# Patient Record
Sex: MALE | Race: WHITE | NOT HISPANIC OR LATINO | URBAN - METROPOLITAN AREA
[De-identification: names, ages, dates, MRNs, and addresses within clinical notes are randomized per-mention and may not be internally consistent; named-entity substitution may affect disease eponyms.]

---

## 2021-03-15 ENCOUNTER — OFFICE VISIT (OUTPATIENT)
Dept: URBAN - METROPOLITAN AREA CLINIC 68 | Facility: CLINIC | Age: 81
End: 2021-03-15

## 2021-04-05 ENCOUNTER — OFFICE VISIT (OUTPATIENT)
Dept: URBAN - METROPOLITAN AREA CLINIC 68 | Facility: CLINIC | Age: 81
End: 2021-04-05

## 2021-10-01 ENCOUNTER — OFFICE VISIT (OUTPATIENT)
Dept: URBAN - METROPOLITAN AREA CLINIC 68 | Facility: CLINIC | Age: 81
End: 2021-10-01

## 2021-10-18 ENCOUNTER — OFFICE VISIT (OUTPATIENT)
Dept: URBAN - METROPOLITAN AREA CLINIC 68 | Facility: CLINIC | Age: 81
End: 2021-10-18

## 2021-11-05 ENCOUNTER — OFFICE VISIT (OUTPATIENT)
Dept: URBAN - METROPOLITAN AREA CLINIC 68 | Facility: CLINIC | Age: 81
End: 2021-11-05

## 2021-11-11 ENCOUNTER — OFFICE VISIT (OUTPATIENT)
Dept: URBAN - METROPOLITAN AREA SURGERY CENTER 12 | Facility: SURGERY CENTER | Age: 81
End: 2021-11-11

## 2021-11-12 ENCOUNTER — LAB OUTSIDE AN ENCOUNTER (OUTPATIENT)
Dept: URBAN - METROPOLITAN AREA CLINIC 68 | Facility: CLINIC | Age: 81
End: 2021-11-12

## 2021-11-12 LAB — 01: (no result)

## 2021-11-24 ENCOUNTER — OFFICE VISIT (OUTPATIENT)
Dept: URBAN - METROPOLITAN AREA CLINIC 68 | Facility: CLINIC | Age: 81
End: 2021-11-24

## 2022-06-04 ENCOUNTER — TELEPHONE ENCOUNTER (OUTPATIENT)
Dept: URBAN - METROPOLITAN AREA CLINIC 68 | Facility: CLINIC | Age: 82
End: 2022-06-04

## 2022-06-04 RX ORDER — METHYLNALTREXONE BROMIDE 150 MG/1
TABLET ORAL
Qty: 12 | Refills: 0 | OUTPATIENT
Start: 2017-08-29 | End: 2017-09-02

## 2022-06-04 RX ORDER — OXYCODONE HYDROCHLORIDE AND ACETAMINOPHEN 10; 325 MG/1; MG/1
PERCOCET( 10-325MG ORAL  AS NEEDED ) INACTIVE -HX ENTRY TABLET ORAL AS NEEDED
OUTPATIENT
Start: 2017-07-28

## 2022-06-04 RX ORDER — CLOPIDOGREL BISULFATE 75 MG
PLAVIX( 75MG ORAL  DAILY ) INACTIVE -HX ENTRY TABLET ORAL DAILY
OUTPATIENT
Start: 2018-01-31

## 2022-06-04 RX ORDER — POLYETHYLENE GLYCOL 3350, SODIUM SULFATE, SODIUM CHLORIDE, POTASSIUM CHLORIDE, ASCORBIC ACID, SODIUM ASCORBATE 7.5-2.691G
KIT ORAL
Qty: 1 | Refills: 0 | OUTPATIENT
Start: 2015-06-23 | End: 2015-06-25

## 2022-06-04 RX ORDER — B-COMPLEX WITH VITAMIN C
VITAMIN B COMPLEX(    ON TABLET BY MOUTH EACH DAY ) INACTIVE -HX ENTRY TABLET ORAL
OUTPATIENT
Start: 2008-12-10

## 2022-06-04 RX ORDER — FLUCONAZOLE 50 MG/1
FLUCONAZOLE( 50MG ORAL  DAILY ) INACTIVE -HX ENTRY TABLET ORAL DAILY
OUTPATIENT
Start: 2019-06-18

## 2022-06-04 RX ORDER — MONTELUKAST SODIUM 10 MG/1
TABLET, FILM COATED ORAL
OUTPATIENT
Start: 2008-12-10 | End: 2014-09-08

## 2022-06-04 RX ORDER — GABAPENTIN 300 MG/1
CAPSULE ORAL
OUTPATIENT
Start: 2011-09-20 | End: 2014-09-08

## 2022-06-04 RX ORDER — TRAMADOL HYDROCHLORIDE 50 MG/1
TRAMADOL HCL( 50MG ORAL  AS DIRECTED ) INACTIVE -HX ENTRY TABLET ORAL AS DIRECTED
OUTPATIENT
Start: 2017-07-28

## 2022-06-04 RX ORDER — HYDROXYCHLOROQUINE SULFATE 200 MG/1
HYDROXYCHLOROQUINE SULFATE( 200MG ORAL  TWICE DAILY ) INACTIVE -HX ENTRY TABLET, FILM COATED ORAL TWICE DAILY
OUTPATIENT
Start: 2017-07-28

## 2022-06-04 RX ORDER — PANTOPRAZOLE SODIUM 40 MG/1
TABLET, DELAYED RELEASE ORAL DAILY
Qty: 90 | Refills: 0 | OUTPATIENT
Start: 2017-08-10 | End: 2017-11-08

## 2022-06-04 RX ORDER — POLYETHYLENE GLYOCOL 3350, SODIUM CHLORIDE, SODIUM BICARBONATE AND POTASSIUM CHLORIDE 420; 11.2; 5.72; 1.48 G/4L; G/4L; G/4L; G/4L
POWDER, FOR SOLUTION NASOGASTRIC; ORAL
OUTPATIENT
Start: 2011-09-20 | End: 2014-09-08

## 2022-06-04 RX ORDER — RABEPRAZOLE SODIUM 20 MG/1
ACIPHEX(    ONE TABLET BY MOUTH 30 MINUTES BEFORE BREAKFAST EACH DAY ) INACTIVE -HX ENTRY TABLET, DELAYED RELEASE ORAL
OUTPATIENT
Start: 2009-08-03

## 2022-06-04 RX ORDER — IRBESARTAN/HYDROCHLOROTHIAZIDE 150-12.5MG
TABLET ORAL
OUTPATIENT
Start: 2008-12-10 | End: 2014-09-08

## 2022-06-04 RX ORDER — B-COMPLEX WITH VITAMIN C
TABLET ORAL
OUTPATIENT
Start: 2009-07-20 | End: 2014-09-08

## 2022-06-04 RX ORDER — FLUTICASONE PROPIONATE AND SALMETEROL 50; 500 UG/1; UG/1
POWDER RESPIRATORY (INHALATION)
OUTPATIENT
Start: 2008-12-10 | End: 2014-09-08

## 2022-06-04 RX ORDER — IPRATROPIUM BROMIDE 42 UG/1
SPRAY, METERED NASAL
OUTPATIENT
Start: 2011-09-20 | End: 2014-09-08

## 2022-06-04 RX ORDER — TAMSULOSIN HYDROCHLORIDE 0.4 MG/1
CAPSULE ORAL
OUTPATIENT
Start: 2009-07-20 | End: 2014-09-08

## 2022-06-04 RX ORDER — RABEPRAZOLE SODIUM 20 MG/1
ACIPHEX(    1 TABLET ORALLY 30 BEFORE BREAKFAST AND 1 TABLET 30 ORALLY BEFORE DINNER. ) INACTIVE -HX ENTRY TABLET, DELAYED RELEASE ORAL
OUTPATIENT
Start: 2009-07-20

## 2022-06-04 RX ORDER — SULFASALAZINE 500 MG/1
SULFASALAZINE( 500MG ORAL 3 TWO TIMES DAILY ) INACTIVE -HX ENTRY TABLET ORAL
OUTPATIENT
Start: 2019-06-18

## 2022-06-04 RX ORDER — HYDROCHLOROTHIAZIDE 25 MG/1
HYDROCHLOROTHIAZIDE( 25MG ORAL  DAILY ) INACTIVE -HX ENTRY TABLET ORAL DAILY
OUTPATIENT
Start: 2017-07-28

## 2022-06-04 RX ORDER — LEVALBUTEROL HYDROCHLORIDE 1.25 MG/3ML
SOLUTION RESPIRATORY (INHALATION) AS NEEDED
OUTPATIENT
Start: 2008-12-10 | End: 2014-09-08

## 2022-06-05 ENCOUNTER — TELEPHONE ENCOUNTER (OUTPATIENT)
Dept: URBAN - METROPOLITAN AREA CLINIC 68 | Facility: CLINIC | Age: 82
End: 2022-06-05

## 2022-06-05 RX ORDER — CARBAMAZEPINE 200 MG/1
TEGRETOL( 200MG ORAL 1 THREE TIMES DAILY ) ACTIVE -HX ENTRY TABLET ORAL
Status: ACTIVE | COMMUNITY
Start: 2021-11-24

## 2022-06-05 RX ORDER — FUROSEMIDE 20 MG/1
FUROSEMIDE( 20MG ORAL  AS DIRECTED ) ACTIVE -HX ENTRY TABLET ORAL AS DIRECTED
Status: ACTIVE | COMMUNITY
Start: 2021-11-24

## 2022-06-05 RX ORDER — PREDNISONE 10 MG/1
PREDNISONE( 10MG ORAL  DAILY ) ACTIVE -HX ENTRY TABLET ORAL DAILY
Status: ACTIVE | COMMUNITY
Start: 2021-11-24

## 2022-06-05 RX ORDER — FLUTICASONE FUROATE AND VILANTEROL TRIFENATATE 100; 25 UG/1; UG/1
BREO ELLIPTA( 100-25MCG/INH INHALATION  AS NEEDED ) ACTIVE -HX ENTRY POWDER RESPIRATORY (INHALATION) AS NEEDED
Status: ACTIVE | COMMUNITY
Start: 2021-11-24

## 2022-06-05 RX ORDER — LOVASTATIN 20 MG/1
LOVASTATIN( 20MG ORAL  DAILY ) ACTIVE -HX ENTRY TABLET ORAL DAILY
Status: ACTIVE | COMMUNITY
Start: 2021-11-24

## 2022-06-05 RX ORDER — METOPROLOL SUCCINATE 50 MG/1
METOPROLOL SUCCINATE ER( 50MG ORAL  TWICE A DAY ) ACTIVE -HX ENTRY TABLET, EXTENDED RELEASE ORAL TWICE A DAY
Status: ACTIVE | COMMUNITY
Start: 2021-11-24

## 2022-06-05 RX ORDER — ALPRAZOLAM 0.5 MG
XANAX( 0.5MG ORAL 1 DAILY ) ACTIVE -HX ENTRY TABLET ORAL DAILY
Status: ACTIVE | COMMUNITY
Start: 2021-11-24

## 2022-06-05 RX ORDER — OXYCODONE HYDROCHLORIDE 30 MG/1
OXYCODONE HCL ER( 30MG ORAL  AS NEEDED ) ACTIVE -HX ENTRY TABLET, FILM COATED, EXTENDED RELEASE ORAL AS NEEDED
Status: ACTIVE | COMMUNITY
Start: 2021-11-24

## 2022-06-25 ENCOUNTER — TELEPHONE ENCOUNTER (OUTPATIENT)
Age: 82
End: 2022-06-25

## 2022-06-25 RX ORDER — ALBUTEROL SULFATE 90 UG/1
INHALANT RESPIRATORY (INHALATION) AS NEEDED
OUTPATIENT
Start: 2008-12-10 | End: 2014-09-08

## 2022-06-25 RX ORDER — MONTELUKAST SODIUM 10 MG/1
TABLET, FILM COATED ORAL
OUTPATIENT
Start: 2008-12-10 | End: 2014-09-08

## 2022-06-25 RX ORDER — FLUCONAZOLE 350 MG/35ML
DIFLUCAN(    AS DIRECTED ) INACTIVE -HX ENTRY POWDER, FOR SUSPENSION ORAL AS DIRECTED
OUTPATIENT
Start: 2009-07-22

## 2022-06-25 RX ORDER — RABEPRAZOLE SODIUM 20 MG/1
ACIPHEX(    1 TABLET ORALLY 30 BEFORE BREAKFAST AND 1 TABLET 30 ORALLY BEFORE DINNER. ) INACTIVE -HX ENTRY TABLET, DELAYED RELEASE ORAL
OUTPATIENT
Start: 2009-07-20

## 2022-06-25 RX ORDER — FLUCONAZOLE 350 MG/35ML
DIFLUCAN(    AS DIRECTED ) INACTIVE -HX ENTRY POWDER, FOR SUSPENSION ORAL AS DIRECTED
OUTPATIENT
Start: 2009-10-02

## 2022-06-25 RX ORDER — NAPROXEN SODIUM 220 MG
ALEVE(    AS NEEDED ) INACTIVE -HX ENTRY TABLET ORAL AS NEEDED
OUTPATIENT
Start: 2008-12-10

## 2022-06-25 RX ORDER — POLYETHYLENE GLYCOL 3350, SODIUM SULFATE, SODIUM CHLORIDE, POTASSIUM CHLORIDE, ASCORBIC ACID, SODIUM ASCORBATE 7.5-2.691G
KIT ORAL
Qty: 1 | Refills: 0 | OUTPATIENT
Start: 2015-06-23 | End: 2015-06-25

## 2022-06-25 RX ORDER — NAPROXEN SODIUM 220 MG
ALEVE(    AS NEEDED ) INACTIVE -HX ENTRY TABLET ORAL AS NEEDED
OUTPATIENT
Start: 2009-09-15

## 2022-06-25 RX ORDER — IPRATROPIUM BROMIDE 42 UG/1
SPRAY NASAL
OUTPATIENT
Start: 2011-09-20 | End: 2014-09-08

## 2022-06-25 RX ORDER — CETIRIZINE HYDROCHLORIDE 10 MG/1
ZYRTEC( 10MG ORAL  DAILY ) INACTIVE -HX ENTRY TABLET, FILM COATED ORAL DAILY
OUTPATIENT
Start: 2017-07-28

## 2022-06-25 RX ORDER — PREDNISONE 10 MG/1
PREDNISONE(    1 TABLET DAILY ) INACTIVE -HX ENTRY TABLET ORAL
OUTPATIENT
Start: 2011-09-20

## 2022-06-25 RX ORDER — DICYCLOMINE HCL 100 %
DICYCLOMINE HCL(    1 PO QID BEFORE MEALS AND HS ) INACTIVE -HX ENTRY POWDER (GRAM) MISCELLANEOUS
OUTPATIENT
Start: 2009-09-01

## 2022-06-25 RX ORDER — B-COMPLEX WITH VITAMIN C
VITAMIN B COMPLEX(    ON TABLET BY MOUTH EACH DAY ) INACTIVE -HX ENTRY TABLET ORAL
OUTPATIENT
Start: 2008-12-10

## 2022-06-25 RX ORDER — OMEGA-3/DHA/EPA/FISH OIL 1000 MG
CAPSULE ORAL
OUTPATIENT
Start: 2009-07-20 | End: 2014-09-08

## 2022-06-25 RX ORDER — HYDROCHLOROTHIAZIDE 25 MG/1
HYDROCHLOROTHIAZIDE( 25MG ORAL  DAILY ) INACTIVE -HX ENTRY TABLET ORAL DAILY
OUTPATIENT
Start: 2017-07-28

## 2022-06-25 RX ORDER — MULTIVITAMIN/IRON/FOLIC ACID 18MG-0.4MG
TABLET ORAL
OUTPATIENT
Start: 2009-07-20 | End: 2014-09-08

## 2022-06-25 RX ORDER — PANTOPRAZOLE 40 MG/1
TABLET, DELAYED RELEASE ORAL DAILY
Qty: 90 | Refills: 0 | OUTPATIENT
Start: 2017-08-10 | End: 2017-11-08

## 2022-06-25 RX ORDER — SUCRALFATE 1 G/1
CARAFATE(    1 TAB 4 TIMES DAILY, BEFORE MEALS AND AT BEDTIME ) INACTIVE -HX ENTRY TABLET ORAL
OUTPATIENT
Start: 2009-08-17

## 2022-06-25 RX ORDER — RABEPRAZOLE SODIUM 20 MG/1
ACIPHEX(    ONE TABLET BY MOUTH 30 MINUTES BEFORE BREAKFAST EACH DAY ) INACTIVE -HX ENTRY TABLET, DELAYED RELEASE ORAL
OUTPATIENT
Start: 2009-08-03

## 2022-06-25 RX ORDER — GLUCOSAMINE SULFATE DIPOT CHLR 1000 MG
TABLET ORAL
OUTPATIENT
Start: 2008-12-10 | End: 2014-09-08

## 2022-06-25 RX ORDER — HYDROXYCHLOROQUINE SULFATE 200 MG/1
HYDROXYCHLOROQUINE SULFATE( 200MG ORAL  TWICE DAILY ) INACTIVE -HX ENTRY TABLET, FILM COATED ORAL TWICE DAILY
OUTPATIENT
Start: 2017-07-28

## 2022-06-25 RX ORDER — ALBUTEROL SULFATE 90 UG/1
ALBUTEROL(    DAILY ) INACTIVE -HX ENTRY INHALANT RESPIRATORY (INHALATION) DAILY
OUTPATIENT
Start: 2017-07-28

## 2022-06-25 RX ORDER — TAMSULOSIN HCL 0.4 MG
CAPSULE ORAL
OUTPATIENT
Start: 2009-07-20 | End: 2014-09-08

## 2022-06-25 RX ORDER — OXYCODONE HYDROCHLORIDE AND ACETAMINOPHEN 10; 325 MG/1; MG/1
PERCOCET( 10-325MG ORAL  AS NEEDED ) INACTIVE -HX ENTRY TABLET ORAL AS NEEDED
OUTPATIENT
Start: 2017-07-28

## 2022-06-25 RX ORDER — DICYCLOMINE HCL 100 %
DICYCLOMINE HCL(    1 PO QID BEFORE MEALS AND HS ) INACTIVE -HX ENTRY POWDER (GRAM) MISCELLANEOUS
OUTPATIENT
Start: 2011-09-20

## 2022-06-25 RX ORDER — BISACODYL 5 MG/1
HALFLYTELY & BISACODYL(    AS DIRECTED ) INACTIVE -HX ENTRY TABLET, COATED ORAL AS DIRECTED
OUTPATIENT
Start: 2008-12-10

## 2022-06-25 RX ORDER — METHYLNALTREXONE BROMIDE 150 MG/1
TABLET ORAL
Qty: 12 | Refills: 0 | OUTPATIENT
Start: 2017-08-29 | End: 2017-09-02

## 2022-06-25 RX ORDER — HYDROCHLOROTHIAZIDE 100 %
HYDROCHLOROTHIAZIDE(    37.5 MG DAILY ) INACTIVE -HX ENTRY POWDER (GRAM) MISCELLANEOUS
OUTPATIENT
Start: 2011-09-20

## 2022-06-25 RX ORDER — TRAMADOL HYDROCHLORIDE 50 MG/1
TRAMADOL HCL( 50MG ORAL  AS DIRECTED ) INACTIVE -HX ENTRY TABLET ORAL AS DIRECTED
OUTPATIENT
Start: 2017-07-28

## 2022-06-25 RX ORDER — AMOXICILLIN/POTASSIUM CLAV 500-125 MG
TABLET ORAL
Qty: 0 | Refills: 0 | OUTPATIENT
Start: 2018-02-01 | End: 2018-02-13

## 2022-06-25 RX ORDER — LEVALBUTEROL HYDROCHLORIDE 1.25 MG/3ML
SOLUTION RESPIRATORY (INHALATION) AS NEEDED
OUTPATIENT
Start: 2008-12-10 | End: 2014-09-08

## 2022-06-25 RX ORDER — FLUCONAZOLE 50 MG/1
FLUCONAZOLE( 50MG ORAL  DAILY ) INACTIVE -HX ENTRY TABLET ORAL DAILY
OUTPATIENT
Start: 2019-06-18

## 2022-06-25 RX ORDER — IRBESARTAN/HYDROCHLOROTHIAZIDE 150-12.5MG
TABLET ORAL
OUTPATIENT
Start: 2008-12-10 | End: 2014-09-08

## 2022-06-25 RX ORDER — PREDNISONE 10 MG/1
PREDNISONE(    AS DIRECTED ) INACTIVE -HX ENTRY TABLET ORAL AS DIRECTED
OUTPATIENT
Start: 2008-12-10

## 2022-06-25 RX ORDER — DICYCLOMINE HCL 100 %
DICYCLOMINE HCL(    1 PO QID BEFORE MEALS AND HS ) INACTIVE -HX ENTRY POWDER (GRAM) MISCELLANEOUS
OUTPATIENT
Start: 2009-09-15

## 2022-06-25 RX ORDER — CLOPIDOGREL BISULFATE 75 MG
PLAVIX( 75MG ORAL  DAILY ) INACTIVE -HX ENTRY TABLET ORAL DAILY
OUTPATIENT
Start: 2018-01-31

## 2022-06-25 RX ORDER — SULFASALAZINE 500 MG/1
SULFASALAZINE( 500MG ORAL 3 TWO TIMES DAILY ) INACTIVE -HX ENTRY TABLET ORAL
OUTPATIENT
Start: 2019-06-18

## 2022-06-25 RX ORDER — B-COMPLEX WITH VITAMIN C
TABLET ORAL
OUTPATIENT
Start: 2009-07-20 | End: 2014-09-08

## 2022-06-25 RX ORDER — ASPIRIN 81 MG/1
TABLET, DELAYED RELEASE ORAL
OUTPATIENT
Start: 2008-12-10 | End: 2014-09-08

## 2022-06-25 RX ORDER — HYDROCODONE BITARTRATE AND ACETAMINOPHEN 325; 10 MG/1; MG/1
VICODIN(    PRN ) INACTIVE -HX ENTRY TABLET ORAL PRN
OUTPATIENT
Start: 2009-07-20

## 2022-06-25 RX ORDER — GABAPENTIN 300 MG
CAPSULE ORAL
OUTPATIENT
Start: 2011-09-20 | End: 2014-09-08

## 2022-06-26 ENCOUNTER — TELEPHONE ENCOUNTER (OUTPATIENT)
Age: 82
End: 2022-06-26

## 2022-06-26 RX ORDER — FLUTICASONE FUROATE AND VILANTEROL TRIFENATATE 100; 25 UG/1; UG/1
BREO ELLIPTA( 100-25MCG/INH INHALATION  AS NEEDED ) ACTIVE -HX ENTRY POWDER RESPIRATORY (INHALATION) AS NEEDED
Status: ACTIVE | COMMUNITY
Start: 2021-11-24

## 2022-06-26 RX ORDER — PREDNISONE 10 MG/1
PREDNISONE( 10MG ORAL  DAILY ) ACTIVE -HX ENTRY TABLET ORAL DAILY
Status: ACTIVE | COMMUNITY
Start: 2021-11-24

## 2022-06-26 RX ORDER — ALPRAZOLAM 0.5 MG
XANAX( 0.5MG ORAL 1 DAILY ) ACTIVE -HX ENTRY TABLET ORAL DAILY
Status: ACTIVE | COMMUNITY
Start: 2021-11-24

## 2022-06-26 RX ORDER — TAMSULOSIN HCL 0.4 MG
FLOMAX( 0.4MG ORAL  AS DIRECTED ) ACTIVE -HX ENTRY CAPSULE ORAL AS DIRECTED
Status: ACTIVE | COMMUNITY
Start: 2021-11-24

## 2022-06-26 RX ORDER — GABAPENTIN 300 MG/1
GABAPENTIN( 300MG ORAL  AT BEDTIME ) ACTIVE -HX ENTRY CAPSULE ORAL AT BEDTIME
Status: ACTIVE | COMMUNITY
Start: 2021-11-24

## 2022-06-26 RX ORDER — METOPROLOL SUCCINATE 50 MG/1
METOPROLOL SUCCINATE ER( 50MG ORAL  TWICE A DAY ) ACTIVE -HX ENTRY TABLET, EXTENDED RELEASE ORAL TWICE A DAY
Status: ACTIVE | COMMUNITY
Start: 2021-11-24

## 2022-06-26 RX ORDER — FUROSEMIDE 20 MG/1
FUROSEMIDE( 20MG ORAL  AS DIRECTED ) ACTIVE -HX ENTRY TABLET ORAL AS DIRECTED
Status: ACTIVE | COMMUNITY
Start: 2021-11-24

## 2022-06-26 RX ORDER — LOVASTATIN 20 MG/1
LOVASTATIN( 20MG ORAL  DAILY ) ACTIVE -HX ENTRY TABLET ORAL DAILY
Status: ACTIVE | COMMUNITY
Start: 2021-11-24

## 2022-06-26 RX ORDER — CARBAMAZEPINE 200 MG
TEGRETOL( 200MG ORAL 1 THREE TIMES DAILY ) ACTIVE -HX ENTRY TABLET ORAL
Status: ACTIVE | COMMUNITY
Start: 2021-11-24

## 2022-11-11 ENCOUNTER — OFFICE VISIT (OUTPATIENT)
Dept: URBAN - METROPOLITAN AREA CLINIC 68 | Facility: CLINIC | Age: 82
End: 2022-11-11

## 2022-11-11 RX ORDER — CARBAMAZEPINE 200 MG/1
TEGRETOL( 200MG ORAL 1 THREE TIMES DAILY ) ACTIVE -HX ENTRY TABLET ORAL
Status: ACTIVE | COMMUNITY
Start: 2021-11-24

## 2022-11-11 RX ORDER — FLUTICASONE FUROATE AND VILANTEROL TRIFENATATE 100; 25 UG/1; UG/1
BREO ELLIPTA( 100-25MCG/INH INHALATION  AS NEEDED ) ACTIVE -HX ENTRY POWDER RESPIRATORY (INHALATION) AS NEEDED
Status: ACTIVE | COMMUNITY
Start: 2021-11-24

## 2022-11-11 RX ORDER — DOXYCYCLINE HYCLATE 100 MG/1
1 CAPSULE CAPSULE, GELATIN COATED ORAL TWICE A DAY
Qty: 14 CAPSULE | OUTPATIENT
Start: 2022-11-11 | End: 2022-11-18

## 2022-11-11 RX ORDER — PREDNISONE 10 MG/1
PREDNISONE( 10MG ORAL  DAILY ) ACTIVE -HX ENTRY TABLET ORAL DAILY
Status: ACTIVE | COMMUNITY
Start: 2021-11-24

## 2022-11-11 RX ORDER — LOVASTATIN 20 MG/1
LOVASTATIN( 20MG ORAL  DAILY ) ACTIVE -HX ENTRY TABLET ORAL DAILY
Status: ACTIVE | COMMUNITY
Start: 2021-11-24

## 2022-11-11 RX ORDER — FUROSEMIDE 20 MG/1
FUROSEMIDE( 20MG ORAL  AS DIRECTED ) ACTIVE -HX ENTRY TABLET ORAL AS DIRECTED
Status: ACTIVE | COMMUNITY
Start: 2021-11-24

## 2022-11-11 RX ORDER — ALPRAZOLAM 0.5 MG
XANAX( 0.5MG ORAL 1 DAILY ) ACTIVE -HX ENTRY TABLET ORAL DAILY
Status: ACTIVE | COMMUNITY
Start: 2021-11-24

## 2022-11-11 RX ORDER — OXYCODONE HYDROCHLORIDE 30 MG/1
OXYCODONE HCL ER( 30MG ORAL  AS NEEDED ) ACTIVE -HX ENTRY TABLET, FILM COATED, EXTENDED RELEASE ORAL AS NEEDED
Status: ACTIVE | COMMUNITY
Start: 2021-11-24

## 2022-11-11 RX ORDER — METOPROLOL SUCCINATE 50 MG/1
METOPROLOL SUCCINATE ER( 50MG ORAL  TWICE A DAY ) ACTIVE -HX ENTRY TABLET, EXTENDED RELEASE ORAL TWICE A DAY
Status: ACTIVE | COMMUNITY
Start: 2021-11-24

## 2022-11-11 NOTE — EXAM-MIGRATED EXAMINATIONS
General Examination: General appearance: ->  alert, pleasant, well-nourished and in no acute distress, chronically ill on wheel chair   Head: -> normocephalic, atraumatic   Eyes: -> pupils equal, round, reactive to light and accommodation, sclera anicteric   Ears: -> normal   Oral cavity: -> mucosa moist   Skin: -> skin is warm and dry, with no rashes, good skin turgor and normal hair distribution, with no suspicious skin lesions   Heart: -> regular rate and rhythm without murmurs, gallops, clicks or rubs   Lungs: -> clear to auscultation bilaterally, with good air movement and no rales, rhonchi or wheezes   Abdomen: -> PEG incision site with erythema and secretions   Rectal: -> not examined    Extremities: -> normal extremity with no clubbing, cyanosis or edema   Neurologic: -> alert and oriented, normal exam with no motor or sensory deficits

## 2022-11-11 NOTE — HPI-MIGRATED HPI
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 Tube is leaking in the proximal part close to the cap.  Also referred increase secretions around the tube.  Denies nausea, vomits, dysphagia, odynophagia, heartburn, abdominal pain, diarrhea, constipation GI bleeding or weight loss

## 2022-11-28 ENCOUNTER — OFFICE VISIT (OUTPATIENT)
Dept: URBAN - METROPOLITAN AREA CLINIC 68 | Facility: CLINIC | Age: 82
End: 2022-11-28

## 2022-11-28 ENCOUNTER — DASHBOARD ENCOUNTERS (OUTPATIENT)
Age: 82
End: 2022-11-28

## 2022-11-28 RX ORDER — CARBAMAZEPINE 200 MG/1
TEGRETOL( 200MG ORAL 1 THREE TIMES DAILY ) ACTIVE -HX ENTRY TABLET ORAL
Status: ACTIVE | COMMUNITY
Start: 2021-11-24

## 2022-11-28 RX ORDER — LOVASTATIN 20 MG/1
LOVASTATIN( 20MG ORAL  DAILY ) ACTIVE -HX ENTRY TABLET ORAL DAILY
Status: ACTIVE | COMMUNITY
Start: 2021-11-24

## 2022-11-28 RX ORDER — PREDNISONE 10 MG/1
PREDNISONE( 10MG ORAL  DAILY ) ACTIVE -HX ENTRY TABLET ORAL DAILY
Status: ACTIVE | COMMUNITY
Start: 2021-11-24

## 2022-11-28 RX ORDER — METOPROLOL SUCCINATE 50 MG/1
METOPROLOL SUCCINATE ER( 50MG ORAL  TWICE A DAY ) ACTIVE -HX ENTRY TABLET, EXTENDED RELEASE ORAL TWICE A DAY
Status: ACTIVE | COMMUNITY
Start: 2021-11-24

## 2022-11-28 RX ORDER — FLUTICASONE FUROATE AND VILANTEROL TRIFENATATE 100; 25 UG/1; UG/1
BREO ELLIPTA( 100-25MCG/INH INHALATION  AS NEEDED ) ACTIVE -HX ENTRY POWDER RESPIRATORY (INHALATION) AS NEEDED
Status: ACTIVE | COMMUNITY
Start: 2021-11-24

## 2022-11-28 RX ORDER — DOXYCYCLINE HYCLATE 100 MG/1
1 CAPSULE CAPSULE, GELATIN COATED ORAL TWICE A DAY
Qty: 14 CAPSULE
Start: 2022-11-11 | End: 2022-12-05

## 2022-11-28 RX ORDER — FUROSEMIDE 20 MG/1
FUROSEMIDE( 20MG ORAL  AS DIRECTED ) ACTIVE -HX ENTRY TABLET ORAL AS DIRECTED
Status: ACTIVE | COMMUNITY
Start: 2021-11-24

## 2022-11-28 RX ORDER — OXYCODONE HYDROCHLORIDE 30 MG/1
OXYCODONE HCL ER( 30MG ORAL  AS NEEDED ) ACTIVE -HX ENTRY TABLET, FILM COATED, EXTENDED RELEASE ORAL AS NEEDED
Status: ACTIVE | COMMUNITY
Start: 2021-11-24

## 2022-11-28 RX ORDER — ALPRAZOLAM 0.5 MG
XANAX( 0.5MG ORAL 1 DAILY ) ACTIVE -HX ENTRY TABLET ORAL DAILY
Status: ACTIVE | COMMUNITY
Start: 2021-11-24

## 2022-11-28 NOTE — HPI-MIGRATED HPI
Transition of Care : Patient evaluated after was started on antibiotic treatment for PEG incision site cellulitis.  Today referred that did  the antibiotics from his pharmacy.  Denies nausea, vomits, dysphagia, odynophagia, heartburn, abdominal pain, diarrhea, constipation GI bleeding or weight loss

## 2022-12-19 ENCOUNTER — OFFICE VISIT (OUTPATIENT)
Dept: URBAN - METROPOLITAN AREA CLINIC 68 | Facility: CLINIC | Age: 82
End: 2022-12-19